# Patient Record
(demographics unavailable — no encounter records)

---

## 2024-11-07 NOTE — PROCEDURE
[FreeTextEntry6] : Nasal Endoscopy: Procedure: Bilateral nasal endoscopy (CPT 37941)   Indication: Anterior rhinoscopy was inadequate to evaluate pathology.   Left: Septum midline Moderate inferior turbinate hypertrophy  Middle meatus with thick mucus, prior max antrostomy Poossible polyp in max Anterior ethmoid does not look to have been dissected  Sphenoethmoidal recess clear, without masses, polyps, or pus  Right:  Septum midline,  Moderate inferior turbinate hypertrophy Middle meatus with polyp extending to top of antrostomy polyp in SER and max with thick mucus posteriorly

## 2024-11-07 NOTE — PLAN
[TextEntry] : I recommend he resume doing saline irrigations.  Cont nasonex, astelin.   We will obtain CT sinus. I will call with results.

## 2024-11-07 NOTE — HISTORY OF PRESENT ILLNESS
[de-identified] : 72M presenting with sinus issues. He had hx of sinus surgery 10-15 years ago reported on the left where they found nasal polyps however now he reports that he is feeling that his congestion and polyp recurred on the left side. He is currently using nasonex and azelastine daily. He reports thick mucus and coughing. He has asthma.
1

## 2024-11-07 NOTE — CONSULT LETTER
[Dear  ___] : Dear  [unfilled], [Consult Letter:] : I had the pleasure of evaluating your patient, [unfilled]. [Please see my note below.] : Please see my note below. [Consult Closing:] : Thank you very much for allowing me to participate in the care of this patient.  If you have any questions, please do not hesitate to contact me. [Sincerely,] : Sincerely, [FreeTextEntry3] : Brendan Phillips MD, [facility]  Otolaryngology  Sinus and Endoscopic Skull Base Surgery  Head and Neck Surgery   [address], [address]  [place] [address]  Tel: [phone]  Fax:[phone]  CAPRI Arreola, PA-C Department of Otolaryngology [facility] at [place]

## 2024-12-05 NOTE — PROCEDURE
[FreeTextEntry6] : Nasal Endoscopy: Procedure: Bilateral nasal endoscopy (CPT 08428)  - from last visit, did not scope today  Indication: Anterior rhinoscopy was inadequate to evaluate pathology.   Left: Septum midline Moderate inferior turbinate hypertrophy  Middle meatus with thick mucus, prior max antrostomy Poossible polyp in max Anterior ethmoid does not look to have been dissected  Sphenoethmoidal recess clear, without masses, polyps, or pus  Right:  Septum midline,  Moderate inferior turbinate hypertrophy Middle meatus with polyp extending to top of antrostomy polyp in SER and max with thick mucus posteriorly

## 2024-12-05 NOTE — HISTORY OF PRESENT ILLNESS
[de-identified] : Update 12/5/24: f/u CRSwNP. Using saline irrigations and nasonex, has not picked up azelastine. Was recommended budesonide irrigations by pulmonologist, hx of ashtma and bronchitis. CT completed showing IMPRESSION:   Chronic sinusitis. Right maxillary sinus air-fluid level which may represent trapped secretions or acute sinusitis. Nasal septal deviation. Nasal cavity polypoid soft tissue likely representing nasal polyposis for which correlation with direct visualization is advised. Narrowing/obstruction of sinus drainage pathways.  72M presenting with sinus issues. He had hx of sinus surgery 10-15 years ago reported on the left where they found nasal polyps however now he reports that he is feeling that his congestion and polyp recurred on the left side. He is currently using nasonex and azelastine daily. He reports thick mucus and coughing. He has asthma.

## 2024-12-05 NOTE — PLAN
[TextEntry] : We extensively reviewed his CT. We discussed conservative vs surgical treatment option. I did encouraged the use of budesonide irrgiations, but do not expect it will entirely resolve his CRS. We reivewed the role of unified airway.   He will consider his treatment options further and call should he decide to proceed with surgery.